# Patient Record
Sex: FEMALE | Race: WHITE | HISPANIC OR LATINO | URBAN - METROPOLITAN AREA
[De-identification: names, ages, dates, MRNs, and addresses within clinical notes are randomized per-mention and may not be internally consistent; named-entity substitution may affect disease eponyms.]

---

## 2023-10-20 ENCOUNTER — OFFICE VISIT (OUTPATIENT)
Dept: URBAN - METROPOLITAN AREA CLINIC 78 | Facility: CLINIC | Age: 48
End: 2023-10-20
Payer: COMMERCIAL

## 2023-10-20 ENCOUNTER — DASHBOARD ENCOUNTERS (OUTPATIENT)
Age: 48
End: 2023-10-20

## 2023-10-20 VITALS
BODY MASS INDEX: 20.09 KG/M2 | HEIGHT: 63 IN | DIASTOLIC BLOOD PRESSURE: 77 MMHG | HEART RATE: 76 BPM | SYSTOLIC BLOOD PRESSURE: 110 MMHG | TEMPERATURE: 98.1 F | RESPIRATION RATE: 16 BRPM | WEIGHT: 113.4 LBS

## 2023-10-20 DIAGNOSIS — Z09 HOSPITAL DISCHARGE FOLLOW-UP: ICD-10-CM

## 2023-10-20 DIAGNOSIS — R11.2 NAUSEA AND VOMITING, UNSPECIFIED VOMITING TYPE: ICD-10-CM

## 2023-10-20 DIAGNOSIS — R10.13 DYSPEPSIA: ICD-10-CM

## 2023-10-20 PROCEDURE — 99213 OFFICE O/P EST LOW 20 MIN: CPT | Performed by: INTERNAL MEDICINE

## 2023-10-20 NOTE — HPI-TODAY'S VISIT:
Patient was referred by .   A copy of this document will be sent to the physician.   Patient is a 48-year-old pleasant female seen after recent ER visits at [facility].  She had presented there on 10/15/2023 for epigastric discomfort and nausea.  Patient states that she drinks socially but on Saturday that we can she drank more than usual and had her symptoms denies pain she went to the emergency room they did labs and gave her omeprazole and ondansetron she feels that ondansetron did not help her pregnancy test was negative Labs were done which revealed sodium of 132 potassium of 3.5 BUN of 13 creatinine of 0.73 with a glucose of 107 liver enzymes were normal with a lipase of 42 hemoglobin revealed 13.3 g/dL and hematocrit was 38.2% platelet count was 289.  Since her symptoms persisted she went back the next day.  Labs were done especially UA which was normalCT scan of abdomen and pelvis with contrast was doneNo definite acute inflammatory process within the abdomen or pelvis, no ileus or bowel obstructionDescription of possible inflammation of the colon is questionable given the lack of pericolonic inflammation this may represent early colitis.  Liver was reported as normal gallbladder normal spleen normal pancreas normal there was a small amount of stool with small amount of stool in right colon  She had a colonoscopy 2 years ago with [facility].  Her father had colon cancer at age 65 she had a normal colonoscopy

## 2023-12-08 ENCOUNTER — OFFICE VISIT (OUTPATIENT)
Dept: URBAN - METROPOLITAN AREA CLINIC 78 | Facility: CLINIC | Age: 48
End: 2023-12-08